# Patient Record
Sex: FEMALE | Race: WHITE | NOT HISPANIC OR LATINO | URBAN - METROPOLITAN AREA
[De-identification: names, ages, dates, MRNs, and addresses within clinical notes are randomized per-mention and may not be internally consistent; named-entity substitution may affect disease eponyms.]

---

## 2024-05-24 ENCOUNTER — OFFICE VISIT (OUTPATIENT)
Dept: URGENT CARE | Facility: CLINIC | Age: 39
End: 2024-05-24
Payer: COMMERCIAL

## 2024-05-24 VITALS
SYSTOLIC BLOOD PRESSURE: 137 MMHG | OXYGEN SATURATION: 96 % | RESPIRATION RATE: 18 BRPM | HEIGHT: 65 IN | DIASTOLIC BLOOD PRESSURE: 93 MMHG | BODY MASS INDEX: 31.65 KG/M2 | WEIGHT: 190 LBS | TEMPERATURE: 99 F | HEART RATE: 85 BPM

## 2024-05-24 DIAGNOSIS — M79.641 RIGHT HAND PAIN: Primary | ICD-10-CM

## 2024-05-24 DIAGNOSIS — R22.31 LOCALIZED SWELLING ON RIGHT HAND: ICD-10-CM

## 2024-05-24 DIAGNOSIS — S69.91XA HAND INJURY, RIGHT, INITIAL ENCOUNTER: ICD-10-CM

## 2024-05-24 PROCEDURE — 99203 OFFICE O/P NEW LOW 30 MIN: CPT | Mod: S$GLB,,, | Performed by: NURSE PRACTITIONER

## 2024-05-24 PROCEDURE — 73130 X-RAY EXAM OF HAND: CPT | Mod: RT,S$GLB,, | Performed by: RADIOLOGY

## 2024-05-24 RX ORDER — DROSPIRENONE AND ETHINYL ESTRADIOL TABLETS 0.02-3(28)
1 KIT ORAL
COMMUNITY
Start: 2024-05-17

## 2024-05-24 RX ORDER — NEBIVOLOL 5 MG/1
5 TABLET ORAL
COMMUNITY
Start: 2024-04-01

## 2024-05-24 RX ORDER — SERTRALINE HYDROCHLORIDE 25 MG/1
25 TABLET, FILM COATED ORAL
COMMUNITY
Start: 2024-03-28

## 2024-05-24 NOTE — PATIENT INSTRUCTIONS
Please drink plenty of fluids.  Please get plenty of rest.  Please return here or go to the Emergency Department for any concerns or worsening of condition.  You were not prescribed an anti-inflammatory medication, and if you do not have any history of stomach/intestinal ulcers, or kidney disease, or are not taking a blood thinner such as Coumadin, Plavix, Pradaxa, Eloquis, or Xaralta for example, it is OK to take over the counter Ibuprofen or Advil or Motrin or Aleve as directed.  Do not take these medications on an empty stomach.  Rest, ice, compression and elevation to the affected joint or limb as needed.  Please follow up with your primary care doctor or specialist as needed.    If you  smoke, please stop smoking.

## 2024-05-24 NOTE — PROGRESS NOTES
"Subjective:      Patient ID: Sabra Leos is a 38 y.o. female.    Vitals:  height is 5' 5" (1.651 m) and weight is 86.2 kg (190 lb). Her oral temperature is 98.8 °F (37.1 °C). Her blood pressure is 137/93 (abnormal) and her pulse is 85. Her respiration is 18 and oxygen saturation is 96%.     Chief Complaint: Hand Injury    3 days ago patient hit her hand on a metal door. Her hand swelled the next day and turned colors.  Patient took tylenol for pain with relief.    38-year-old female presents to clinic with complaints of right-hand injury resulting in aching pain rated 8 on a scale 0-10 and swelling that occurred three days ago after hitting hand on a metal door. Treating the pain with Tylenol and ice     Hand Injury   Her dominant hand is their right hand. The incident occurred 3 to 5 days ago. The incident occurred in the street. The injury mechanism was a direct blow. The pain is present in the right hand. The quality of the pain is described as aching. The pain is at a severity of 8/10. The pain is moderate. The pain has been Improving since the incident. Pertinent negatives include no numbness or tingling. She has tried ice for the symptoms. The treatment provided moderate relief.       Musculoskeletal:  Positive for pain, trauma and muscle ache. Negative for joint pain, joint swelling and abnormal ROM of joint.   Skin:  Positive for bruising. Negative for erythema.   Neurological:  Negative for numbness and tingling.   Hematologic/Lymphatic: Negative for easy bruising/bleeding. Does not bruise/bleed easily.      Objective:     Physical Exam   Constitutional: She is oriented to person, place, and time. She appears well-developed.   HENT:   Head: Normocephalic and atraumatic. Head is without abrasion, without contusion and without laceration.   Ears:   Right Ear: External ear normal.   Left Ear: External ear normal.   Nose: Nose normal.   Mouth/Throat: Oropharynx is clear and moist and mucous membranes are " normal.   Eyes: EOM and lids are normal. Extraocular movement intact   Neck: Trachea normal and phonation normal. Neck supple.   Cardiovascular: Normal rate and normal pulses.   Pulmonary/Chest: Effort normal. No stridor. No respiratory distress.   Musculoskeletal: Normal range of motion.         General: Normal range of motion.        Arms:       Right hand: She exhibits tenderness and swelling. She exhibits normal range of motion, no bony tenderness, normal two-point discrimination and normal capillary refill. Normal sensation noted. Decreased sensation is not present in the ulnar distribution, is not present in the medial distribution and is not present in the radial distribution. Normal strength noted. Motor /Testing: The patient has normal right wrist strength.      Comments: Pain 5th metatarsal right hand   Neurological: She is alert and oriented to person, place, and time.   Skin: Skin is warm, dry, intact and no rash. Capillary refill takes less than 2 seconds. No abrasion, No burn, No bruising, No erythema and No ecchymosis   Psychiatric: Her speech is normal and behavior is normal. Judgment and thought content normal.   Nursing note and vitals reviewed.      Assessment:     1. Right hand pain    2. Hand injury, right, initial encounter    3. Localized swelling on right hand        Plan:       Right hand pain  -     X-Ray Hand 3 View Right; Future; Expected date: 05/24/2024    Hand injury, right, initial encounter    Localized swelling on right hand    X-Ray Hand 3 View Right    Result Date: 5/24/2024  EXAMINATION: XR HAND COMPLETE 3 VIEW RIGHT CLINICAL HISTORY: Pain in right hand TECHNIQUE: PA, lateral, and oblique views of the right hand were performed. COMPARISON: None. FINDINGS: The skeletal structures are intact.  No acute fracture or dislocation is seen.  Small soft tissue calcification adjacent to the ulnar styloid is likely old.  The joint spaces are satisfactory overall without significant  arthritis.  No focal soft tissue swelling is seen.     Negative for fracture Electronically signed by: Jose Rafael Toussaint MD Date:    05/24/2024 Time:    10:47      Reviewed xray with patient who acknowledged results. Discussed  Diagnosis and treatment plan with patient who verbalized understanding and agrees with plan of care.  Advised on the need to call and schedule a follow-up appointment with Orthopedics or PCP no resolve. Patient given educational handouts supporting this diagnosis as well. Patient is currently using a hand splint for support. Patient denies any further questions or concerns at this time.  Patient exits exam room in no acute distress.           Patient Instructions   Please drink plenty of fluids.  Please get plenty of rest.  Please return here or go to the Emergency Department for any concerns or worsening of condition.  You were not prescribed an anti-inflammatory medication, and if you do not have any history of stomach/intestinal ulcers, or kidney disease, or are not taking a blood thinner such as Coumadin, Plavix, Pradaxa, Eloquis, or Xaralta for example, it is OK to take over the counter Ibuprofen or Advil or Motrin or Aleve as directed.  Do not take these medications on an empty stomach.  Rest, ice, compression and elevation to the affected joint or limb as needed.  Please follow up with your primary care doctor or specialist as needed.    If you  smoke, please stop smoking.